# Patient Record
Sex: FEMALE | Race: WHITE | NOT HISPANIC OR LATINO
[De-identification: names, ages, dates, MRNs, and addresses within clinical notes are randomized per-mention and may not be internally consistent; named-entity substitution may affect disease eponyms.]

---

## 2017-05-08 ENCOUNTER — APPOINTMENT (OUTPATIENT)
Dept: PSYCHIATRY | Facility: CLINIC | Age: 22
End: 2017-05-08

## 2017-05-08 RX ORDER — LAMOTRIGINE 100 MG/1
100 TABLET ORAL
Qty: 60 | Refills: 0 | Status: DISCONTINUED | COMMUNITY
Start: 2016-12-28

## 2017-05-08 RX ORDER — FLUCONAZOLE 150 MG/1
150 TABLET ORAL
Qty: 1 | Refills: 0 | Status: DISCONTINUED | COMMUNITY
Start: 2016-12-29

## 2017-05-08 RX ORDER — GABAPENTIN 300 MG/1
300 CAPSULE ORAL
Qty: 60 | Refills: 0 | Status: DISCONTINUED | COMMUNITY
Start: 2016-12-28

## 2017-05-16 ENCOUNTER — APPOINTMENT (OUTPATIENT)
Dept: PSYCHIATRY | Facility: CLINIC | Age: 22
End: 2017-05-16

## 2017-05-16 DIAGNOSIS — G47.00 INSOMNIA, UNSPECIFIED: ICD-10-CM

## 2017-05-19 ENCOUNTER — TRANSCRIPTION ENCOUNTER (OUTPATIENT)
Age: 22
End: 2017-05-19

## 2017-05-20 ENCOUNTER — EMERGENCY (EMERGENCY)
Facility: HOSPITAL | Age: 22
LOS: 1 days | Discharge: HOME | End: 2017-05-20
Attending: EMERGENCY MEDICINE | Admitting: EMERGENCY MEDICINE
Payer: COMMERCIAL

## 2017-05-20 VITALS
WEIGHT: 95.02 LBS | TEMPERATURE: 98 F | DIASTOLIC BLOOD PRESSURE: 80 MMHG | HEART RATE: 81 BPM | HEIGHT: 62 IN | SYSTOLIC BLOOD PRESSURE: 116 MMHG | OXYGEN SATURATION: 98 % | RESPIRATION RATE: 18 BRPM

## 2017-05-20 PROCEDURE — 99282 EMERGENCY DEPT VISIT SF MDM: CPT

## 2017-05-20 PROCEDURE — 99283 EMERGENCY DEPT VISIT LOW MDM: CPT

## 2017-05-20 NOTE — ED PROVIDER NOTE - DETAILS:
I, Fox Arshad, performed the initial face to face bedside interview with this patient regarding history of present illness, review of symptoms and relevant past medical, social and family history.  I completed an independent physical examination.  I was the initial provider who evaluated this patient.  The history, relevant review of systems, past medical and surgical history, medical decision making, and physical examination was documented by the scribe in my presence and I attest to the accuracy of the documentation.

## 2017-05-20 NOTE — ED PROVIDER NOTE - NS ED MD SCRIBE ATTENDING SCRIBE SECTIONS
PAST MEDICAL/SURGICAL/SOCIAL HISTORY/PHYSICAL EXAM/VITAL SIGNS( Pullset)/REVIEW OF SYSTEMS/HISTORY OF PRESENT ILLNESS

## 2017-05-20 NOTE — ED ADULT NURSE NOTE - CHIEF COMPLAINT QUOTE
For awhile now, since the winter, I have piercing numbness to my chin.  I saw ashley  who dx with TMJ, saw PMD who gave me an antifungal cream and was dissmissive.

## 2017-05-20 NOTE — ED ADULT NURSE NOTE - OBJECTIVE STATEMENT
22YO female walked in ED, pt c/o "chin numbness". pt denies any pain, trauma. pt indicates "this has been happening for some time now".

## 2017-05-20 NOTE — ED PROVIDER NOTE - OBJECTIVE STATEMENT
20 y/o F w/ no significant PMHx presents to the ED c/o numbness for a long time. Pt states that "particularly in cold weathers, I get acute numbness in my chin." Pt states that she went to a maxillofacial specialist and that previous MDs that she has seen have not been able to narrow down on her condition. Pt denies having any recent dental work but notes that she has had cavities in her molars. Pt denies  fever, cough, tick bite, rash or any other complaints. Saw a neuro recently for an intense temporal pressure along with lower back pain. Pt takes Lamictal, Gabapentin, Seroquel, Clonazepam. Marijuana usage, social drinker,

## 2017-05-20 NOTE — ED PROVIDER NOTE - ENMT, MLM
Airway patent, Nasal mucosa clear. Mouth with normal mucosa. Throat has no vesicles, no oropharyngeal exudates and uvula is midline. No clicking of jaws

## 2017-05-21 ENCOUNTER — EMERGENCY (EMERGENCY)
Facility: HOSPITAL | Age: 22
LOS: 1 days | Discharge: HOME | End: 2017-05-21
Attending: EMERGENCY MEDICINE | Admitting: EMERGENCY MEDICINE
Payer: COMMERCIAL

## 2017-05-21 VITALS
SYSTOLIC BLOOD PRESSURE: 104 MMHG | TEMPERATURE: 99 F | DIASTOLIC BLOOD PRESSURE: 69 MMHG | HEART RATE: 100 BPM | HEIGHT: 62 IN | RESPIRATION RATE: 16 BRPM | OXYGEN SATURATION: 99 % | WEIGHT: 100.09 LBS

## 2017-05-21 VITALS
TEMPERATURE: 98 F | HEART RATE: 82 BPM | SYSTOLIC BLOOD PRESSURE: 111 MMHG | DIASTOLIC BLOOD PRESSURE: 74 MMHG | RESPIRATION RATE: 18 BRPM | OXYGEN SATURATION: 99 %

## 2017-05-21 DIAGNOSIS — R20.0 ANESTHESIA OF SKIN: ICD-10-CM

## 2017-05-21 DIAGNOSIS — F41.8 OTHER SPECIFIED ANXIETY DISORDERS: ICD-10-CM

## 2017-05-21 DIAGNOSIS — G62.9 POLYNEUROPATHY, UNSPECIFIED: ICD-10-CM

## 2017-05-21 PROCEDURE — 99283 EMERGENCY DEPT VISIT LOW MDM: CPT

## 2017-05-21 PROCEDURE — 99282 EMERGENCY DEPT VISIT SF MDM: CPT

## 2017-05-21 NOTE — ED ADULT NURSE NOTE - OBJECTIVE STATEMENT
Pt. received alert and oriented x4 with chief complaint of numbness to chin since winter of 2016 w/ lump to left side of chin. No deformity noted to chin.

## 2017-05-21 NOTE — ED PROVIDER NOTE - OBJECTIVE STATEMENT
chin sharp numbness chin sharp numbness.  pt was seen in  ED yesterday for same complaint, but had no MRI there. no other complaint.

## 2017-05-23 ENCOUNTER — APPOINTMENT (OUTPATIENT)
Dept: COLORECTAL SURGERY | Facility: CLINIC | Age: 22
End: 2017-05-23

## 2017-05-23 VITALS
HEART RATE: 84 BPM | DIASTOLIC BLOOD PRESSURE: 81 MMHG | WEIGHT: 95 LBS | HEIGHT: 62 IN | TEMPERATURE: 98.1 F | BODY MASS INDEX: 17.48 KG/M2 | RESPIRATION RATE: 16 BRPM | SYSTOLIC BLOOD PRESSURE: 127 MMHG

## 2017-05-23 DIAGNOSIS — K64.8 OTHER HEMORRHOIDS: ICD-10-CM

## 2017-05-24 ENCOUNTER — EMERGENCY (EMERGENCY)
Facility: HOSPITAL | Age: 22
LOS: 1 days | Discharge: HOME | End: 2017-05-24
Attending: EMERGENCY MEDICINE | Admitting: EMERGENCY MEDICINE
Payer: COMMERCIAL

## 2017-05-24 ENCOUNTER — APPOINTMENT (OUTPATIENT)
Dept: PSYCHIATRY | Facility: CLINIC | Age: 22
End: 2017-05-24

## 2017-05-24 VITALS
SYSTOLIC BLOOD PRESSURE: 108 MMHG | TEMPERATURE: 100 F | RESPIRATION RATE: 14 BRPM | DIASTOLIC BLOOD PRESSURE: 76 MMHG | OXYGEN SATURATION: 99 % | HEART RATE: 60 BPM

## 2017-05-24 VITALS
SYSTOLIC BLOOD PRESSURE: 129 MMHG | RESPIRATION RATE: 16 BRPM | TEMPERATURE: 99 F | DIASTOLIC BLOOD PRESSURE: 94 MMHG | OXYGEN SATURATION: 100 % | HEIGHT: 62 IN | HEART RATE: 81 BPM | WEIGHT: 93.04 LBS

## 2017-05-24 DIAGNOSIS — F41.9 ANXIETY DISORDER, UNSPECIFIED: ICD-10-CM

## 2017-05-24 DIAGNOSIS — F41.8 OTHER SPECIFIED ANXIETY DISORDERS: ICD-10-CM

## 2017-05-24 DIAGNOSIS — F40.00 AGORAPHOBIA, UNSPECIFIED: ICD-10-CM

## 2017-05-24 PROCEDURE — 99283 EMERGENCY DEPT VISIT LOW MDM: CPT

## 2017-05-24 PROCEDURE — 99284 EMERGENCY DEPT VISIT MOD MDM: CPT

## 2017-05-24 RX ORDER — CLONAZEPAM 1 MG
1 TABLET ORAL ONCE
Qty: 0 | Refills: 0 | Status: DISCONTINUED | OUTPATIENT
Start: 2017-05-24 | End: 2017-05-24

## 2017-05-24 RX ADMIN — Medication 1 MILLIGRAM(S): at 14:46

## 2017-05-24 NOTE — ED PROVIDER NOTE - OBJECTIVE STATEMENT
22 yo white female with slight increase in severity of her anxiety. No changes in her meds. No SI or HI

## 2017-05-24 NOTE — ED ADULT NURSE NOTE - CHPI ED SYMPTOMS NEG
no agitation/no hallucinations/no disorientation/no homicidal/no change in level of consciousness/no weight loss/no suicidal/no confusion/no paranoia/no weakness

## 2017-05-24 NOTE — ED PROVIDER NOTE - CONSTITUTIONAL, MLM
normal... Well appearing, thin white female, well nourished, awake, alert, oriented to person, place, time/situation and in no apparent distress.

## 2017-05-24 NOTE — ED ADULT NURSE NOTE - OBJECTIVE STATEMENT
Pt A&Ox4, ambulatory to ED, visibly anxious, shaking, states she has been shaky since last night, feels head pressure.  Pt currently under the care of a psychiatrist has medications prescribed but states they were recently changed almost a month ago and pt states "I think they are making my body chemistry be off."

## 2017-05-24 NOTE — ED PROVIDER NOTE - CHPI ED SYMPTOMS NEG
no disorientation/no hallucinations/no change in level of consciousness/no agitation/no suicidal/no paranoia/no confusion/no homicidal

## 2017-05-26 ENCOUNTER — APPOINTMENT (OUTPATIENT)
Dept: PSYCHIATRY | Facility: CLINIC | Age: 22
End: 2017-05-26

## 2017-05-28 ENCOUNTER — APPOINTMENT (OUTPATIENT)
Dept: MRI IMAGING | Facility: CLINIC | Age: 22
End: 2017-05-28

## 2017-05-28 ENCOUNTER — OUTPATIENT (OUTPATIENT)
Dept: OUTPATIENT SERVICES | Facility: HOSPITAL | Age: 22
LOS: 1 days | End: 2017-05-28
Payer: COMMERCIAL

## 2017-05-28 DIAGNOSIS — Z00.8 ENCOUNTER FOR OTHER GENERAL EXAMINATION: ICD-10-CM

## 2017-05-28 PROCEDURE — 70551 MRI BRAIN STEM W/O DYE: CPT

## 2017-06-05 ENCOUNTER — APPOINTMENT (OUTPATIENT)
Dept: PSYCHIATRY | Facility: CLINIC | Age: 22
End: 2017-06-05

## 2017-06-14 ENCOUNTER — APPOINTMENT (OUTPATIENT)
Dept: PSYCHIATRY | Facility: CLINIC | Age: 22
End: 2017-06-14

## 2017-06-14 RX ORDER — METHYLPREDNISOLONE 4 MG/1
4 TABLET ORAL
Qty: 21 | Refills: 0 | Status: DISCONTINUED | COMMUNITY
Start: 2017-05-11

## 2017-06-14 RX ORDER — CLONAZEPAM 1 MG/1
1 TABLET ORAL
Qty: 30 | Refills: 0 | Status: DISCONTINUED | COMMUNITY
Start: 2016-12-20 | End: 2017-06-14

## 2017-06-14 RX ORDER — DEXTROAMPHETAMINE SACCHARATE, AMPHETAMINE ASPARTATE, DEXTROAMPHETAMINE SULFATE AND AMPHETAMINE SULFATE 2.5; 2.5; 2.5; 2.5 MG/1; MG/1; MG/1; MG/1
10 TABLET ORAL
Qty: 1 | Refills: 0 | Status: DISCONTINUED | COMMUNITY
Start: 2017-05-08 | End: 2017-06-14

## 2017-08-21 ENCOUNTER — RX RENEWAL (OUTPATIENT)
Age: 22
End: 2017-08-21

## 2017-08-22 ENCOUNTER — RX RENEWAL (OUTPATIENT)
Age: 22
End: 2017-08-22

## 2017-08-25 ENCOUNTER — APPOINTMENT (OUTPATIENT)
Dept: PSYCHIATRY | Facility: CLINIC | Age: 22
End: 2017-08-25
Payer: COMMERCIAL

## 2017-08-25 PROCEDURE — 99214 OFFICE O/P EST MOD 30 MIN: CPT

## 2017-08-25 RX ORDER — QUETIAPINE FUMARATE 50 MG/1
50 TABLET ORAL
Qty: 30 | Refills: 2 | Status: DISCONTINUED | COMMUNITY
Start: 2017-05-26 | End: 2017-08-25

## 2017-08-25 RX ORDER — QUETIAPINE FUMARATE 25 MG/1
25 TABLET ORAL
Qty: 30 | Refills: 2 | Status: DISCONTINUED | COMMUNITY
Start: 2017-05-08 | End: 2017-08-25

## 2017-10-10 ENCOUNTER — APPOINTMENT (OUTPATIENT)
Dept: PSYCHIATRY | Facility: CLINIC | Age: 22
End: 2017-10-10
Payer: COMMERCIAL

## 2017-10-10 PROCEDURE — 99214 OFFICE O/P EST MOD 30 MIN: CPT

## 2017-10-30 ENCOUNTER — APPOINTMENT (OUTPATIENT)
Dept: PSYCHIATRY | Facility: CLINIC | Age: 22
End: 2017-10-30

## 2017-10-31 ENCOUNTER — APPOINTMENT (OUTPATIENT)
Dept: PSYCHIATRY | Facility: CLINIC | Age: 22
End: 2017-10-31
Payer: COMMERCIAL

## 2017-10-31 PROCEDURE — 99214 OFFICE O/P EST MOD 30 MIN: CPT

## 2017-10-31 RX ORDER — BUPROPION HYDROCHLORIDE 150 MG/1
150 TABLET, EXTENDED RELEASE ORAL DAILY
Qty: 30 | Refills: 2 | Status: DISCONTINUED | COMMUNITY
Start: 2017-10-10 | End: 2017-10-31

## 2017-10-31 RX ORDER — TRETINOIN 0.25 MG/G
0.03 CREAM TOPICAL
Qty: 20 | Refills: 0 | Status: DISCONTINUED | COMMUNITY
Start: 2017-10-04

## 2017-11-08 ENCOUNTER — TRANSCRIPTION ENCOUNTER (OUTPATIENT)
Age: 22
End: 2017-11-08

## 2017-11-08 ENCOUNTER — RX RENEWAL (OUTPATIENT)
Age: 22
End: 2017-11-08

## 2017-12-04 ENCOUNTER — RX RENEWAL (OUTPATIENT)
Age: 22
End: 2017-12-04

## 2017-12-20 ENCOUNTER — APPOINTMENT (OUTPATIENT)
Dept: PSYCHIATRY | Facility: CLINIC | Age: 22
End: 2017-12-20
Payer: SELF-PAY

## 2017-12-20 DIAGNOSIS — F12.20 CANNABIS DEPENDENCE, UNCOMPLICATED: ICD-10-CM

## 2017-12-20 DIAGNOSIS — F39 UNSPECIFIED MOOD [AFFECTIVE] DISORDER: ICD-10-CM

## 2017-12-20 DIAGNOSIS — G47.00 INSOMNIA, UNSPECIFIED: ICD-10-CM

## 2017-12-20 PROCEDURE — 99214 OFFICE O/P EST MOD 30 MIN: CPT

## 2017-12-20 RX ORDER — GABAPENTIN 600 MG/1
600 TABLET, COATED ORAL 3 TIMES DAILY
Qty: 90 | Refills: 0 | Status: ACTIVE | COMMUNITY
Start: 2017-06-14 | End: 1900-01-01

## 2017-12-20 RX ORDER — LAMOTRIGINE 200 MG/1
200 TABLET ORAL DAILY
Qty: 30 | Refills: 2 | Status: DISCONTINUED | COMMUNITY
Start: 2017-10-10 | End: 2017-12-20

## 2017-12-20 RX ORDER — FLUOXETINE HYDROCHLORIDE 20 MG/1
20 TABLET ORAL
Qty: 30 | Refills: 0 | Status: DISCONTINUED | COMMUNITY
Start: 2017-10-31 | End: 2017-12-20

## 2017-12-20 RX ORDER — CLONAZEPAM 0.5 MG/1
0.5 TABLET ORAL
Qty: 30 | Refills: 0 | Status: ACTIVE | COMMUNITY
Start: 2017-05-08 | End: 1900-01-01

## 2017-12-20 RX ORDER — LAMOTRIGINE 150 MG/1
150 TABLET ORAL TWICE DAILY
Qty: 60 | Refills: 2 | Status: ACTIVE | COMMUNITY
Start: 2017-05-08 | End: 1900-01-01

## 2017-12-20 RX ORDER — GABAPENTIN 400 MG/1
400 CAPSULE ORAL 4 TIMES DAILY
Qty: 120 | Refills: 2 | Status: DISCONTINUED | COMMUNITY
Start: 2017-05-08 | End: 2017-12-20

## 2017-12-20 RX ORDER — FLUOXETINE HYDROCHLORIDE 20 MG/1
20 CAPSULE ORAL
Qty: 30 | Refills: 2 | Status: ACTIVE | COMMUNITY
Start: 2017-10-31 | End: 1900-01-01

## 2020-03-27 NOTE — ED ADULT NURSE NOTE - CAS TRG GENERAL AIRWAY, MLM
[facility] phone call message- patient requesting a refill:    Full Medication Name: hydrochlorothiazide (HYDRODIURIL)     Dose: Take 1 tablet (25 mg) by mouth daily - Oral    Pharmacy confirmed as   [facility] - [address] [address]  Phone: [phone] Fax: [phone]  : Yes    Additional Comments: None       OK to leave a message on voice mail? Yes    Primary language: English      needed? No    Call taken on March 27, 2020 at 8:18 AM by Елена Nunn   Patent

## 2021-03-09 NOTE — ED PROVIDER NOTE - DATA REVIEWED, MDM
INPATIENT ENCOUNTER  DAILY PROGRESS NOTE        SUBJECTIVE:  Afebrile  Complaining of some congestion and headache  No cough no shortness of breath    MEDICATIONS:    Current Facility-Administered Medications   Medication Dose Route Frequency Provider Last Rate Last Admin   • hydrALAZINE (APRESOLINE) tablet 50 mg  50 mg Oral TID Federico Castañeda MD   50 mg at 03/09/21 1320   • fluticasone (FLONASE) 50 MCG/ACT nasal spray 1 spray  1 spray Each Nare Daily Cinthia Cabrales MD   1 spray at 03/09/21 1322   • hydrochlorothiazide (HYDRODIURIL) tablet 25 mg  25 mg Oral Daily Cinthia Cabrales MD       • sodium chloride (NORMAL SALINE) 0.9 % bolus 500 mL  500 mL Intravenous PRN Cinthia Cabrales MD       • Potassium Standard Replacement Protocol   Does not apply See Admin Instructions Cinthia Cabrales MD       • Magnesium Standard Replacement Protocol   Does not apply See Admin Instructions Cinthia Cabrales MD       • ondansetron (ZOFRAN) injection 4 mg  4 mg Intravenous BID PRN Cinthia Cabrales MD       • acetaminophen (TYLENOL) tablet 650 mg  650 mg Oral Q4H PRN Cinthia Cabrales MD   650 mg at 03/09/21 0853   • polyethylene glycol (MIRALAX) packet 17 g  17 g Oral Daily PRN Cinthia Cabrales MD       • docusate sodium-sennosides (SENOKOT S) 50-8.6 MG 2 tablet  2 tablet Oral Daily PRN Cinthia Cabrales MD       • magnesium hydroxide (MILK OF MAGNESIA) 400 MG/5ML suspension 30 mL  30 mL Oral Daily PRN Cinthia Cabrales MD       • aluminum-magnesium hydroxide-simethicone (MAALOX) 200-200-20 MG/5ML suspension 30 mL  30 mL Oral Q4H PRN Cinthia Cabrales MD       • sodium chloride 0.9 % flush bag 25 mL  25 mL Intravenous PRN Cinthia Cabrales MD       • hydrALAZINE (APRESOLINE) injection 10 mg  10 mg Intravenous Q6H PRN Cinthia Cabrales MD   10 mg at 03/09/21 1554   • carvedilol (COREG) tablet 12.5 mg  12.5 mg Oral BID WC Cinthia Cabrales MD   12.5 mg at 03/09/21 0853   • enoxaparin (LOVENOX) injection 40 mg  40 mg Subcutaneous QHS  Cinthia Cabrales MD   40 mg at 03/08/21 2227   • NIFEdipine XL (PROCARDIA XL) ER tablet 30 mg  30 mg Oral Daily Cinthia Cabrales MD   30 mg at 03/08/21 2226         REVIEW OF SYSTEMS:  Review of Systems   Constitutional: Negative for activity change, chills and fever.   HENT: Positive for congestion. Negative for trouble swallowing.    Eyes: Negative for visual disturbance.   Respiratory: Negative for apnea, chest tightness, shortness of breath and wheezing.    Cardiovascular: Negative for chest pain and palpitations.   Gastrointestinal: Negative for abdominal pain, nausea and vomiting.   Genitourinary: Negative for difficulty urinating and dysuria.   Skin: Negative for wound.   Neurological: Positive for headaches. Negative for dizziness and weakness.        OBJECTIVE:    VITAL SIGNS:  Visit Vitals  BP (!) 175/126   Pulse 91   Temp 98.2 °F (36.8 °C) (Oral)   Resp 14   Ht 5' 9\" (1.753 m)   Wt 98.8 kg (217 lb 13 oz)   SpO2 97%   BMI 32.17 kg/m²        INTAKE/OUTPUT:    No intake or output data in the 24 hours ending 03/09/21 1606      Physical Exam  Vitals signs and nursing note reviewed.   Constitutional:       Appearance: Normal appearance.   HENT:      Head: Normocephalic and atraumatic.      Nose: Nose normal. No congestion.      Mouth/Throat:      Mouth: Mucous membranes are moist.      Pharynx: Oropharynx is clear.   Eyes:      Extraocular Movements: Extraocular movements intact.      Conjunctiva/sclera: Conjunctivae normal.      Pupils: Pupils are equal, round, and reactive to light.   Neck:      Musculoskeletal: Neck supple. No muscular tenderness.   Cardiovascular:      Rate and Rhythm: Normal rate and regular rhythm.      Pulses: Normal pulses.      Heart sounds: Normal heart sounds.   Abdominal:      General: Abdomen is flat. Bowel sounds are normal. There is no distension.      Palpations: Abdomen is soft. There is no mass.      Tenderness: There is no abdominal tenderness. There is no guarding.    Musculoskeletal:         General: No swelling or tenderness.   Skin:     General: Skin is warm and dry.      Findings: No rash.   Neurological:      General: No focal deficit present.      Mental Status: He is alert and oriented to person, place, and time. Mental status is at baseline.   Psychiatric:         Mood and Affect: Mood normal.         Behavior: Behavior normal.           LABORATORY DATA:      Recent Results (from the past 24 hour(s))   Basic Metabolic Panel    Collection Time: 03/09/21  5:13 AM   Result Value Ref Range    Fasting Status      Sodium 140 135 - 145 mmol/L    Potassium 3.7 3.4 - 5.1 mmol/L    Chloride 104 98 - 107 mmol/L    Carbon Dioxide 27 21 - 32 mmol/L    Anion Gap 13 10 - 20 mmol/L    Glucose 98 65 - 99 mg/dL    BUN 13 6 - 20 mg/dL    Creatinine 1.52 (H) 0.67 - 1.17 mg/dL    Glomerular Filtration Rate 64 (L) >90 mL/min/1.73m2    BUN/ Creatinine Ratio 9 7 - 25    Calcium 9.1 8.4 - 10.2 mg/dL   CBC with Automated Differential (performable only)    Collection Time: 03/09/21  5:13 AM   Result Value Ref Range    WBC 6.2 4.2 - 11.0 K/mcL    RBC 5.43 4.50 - 5.90 mil/mcL    HGB 16.3 13.0 - 17.0 g/dL    HCT 48.7 39.0 - 51.0 %    MCV 89.7 78.0 - 100.0 fl    MCH 30.0 26.0 - 34.0 pg    MCHC 33.5 32.0 - 36.5 g/dL    RDW-CV 13.2 11.0 - 15.0 %    RDW-SD 43.1 39.0 - 50.0 fL     140 - 450 K/mcL    NRBC 0 <=0 /100 WBC    Neutrophil, Percent 43 %    Lymphocytes, Percent 40 %    Mono, Percent 11 %    Eosinophils, Percent 4 %    Basophils, Percent 1 %    Immature Granulocytes 1 %    Absolute Neutrophils 2.7 1.8 - 7.7 K/mcL    Absolute Lymphocytes 2.5 1.0 - 4.8 K/mcL    Absolute Monocytes 0.7 0.3 - 0.9 K/mcL    Absolute Eosinophils  0.2 0.0 - 0.5 K/mcL    Absolute Basophils 0.1 0.0 - 0.3 K/mcL    Absolute Immmature Granulocytes 0.0 0.0 - 0.2 K/mcL   Troponin I Ultra Sensitive    Collection Time: 03/09/21  8:20 AM   Result Value Ref Range    Troponin I, Ultra Sensitive 0.02 <=0.04 ng/mL   Free T4     Collection Time: 03/09/21  8:20 AM   Result Value Ref Range    T4, Free 0.9 0.8 - 1.5 ng/dL          IMAGING STUDIES:    No orders to display          ASSESSMENT AND PLAN:     41-year-old with a history of hypertension who presents to the emergency room with dizziness noted to have elevated troponin     Dizziness  Noted some improvement  Likely related to blood pressure however patient with ongoing headache will check a CT head further recommendations to follow     Elevated troponin  Pattern ongoing elevation of blood pressure patient seen and evaluated by cardiology  -Echocardiogram 3/8/2021 noted EF of 34% with grade 1 diastolic dysfunction mild MR  2016 echo noted EF 50 to 55%    ACUTE CHF ref  Clinically euvolemic continue current regimen  Continue beta-blocker we will add lisinopril  Follow-up on any further cardiology recommendations likely needs further evaluation to rule out ischemia timing as per cardiology  Blood pressure control     Hypertension with chronic kidney disease stage III  Uncontrolled  Current regimen meds adjusted    DVT Prophylaxis   subcu Lovenox    Code Status  Full          CODE STATUS:  Full Resuscitation      Cinthia Cabrales MD    vital signs/nurses notes

## 2021-03-12 ENCOUNTER — TRANSCRIPTION ENCOUNTER (OUTPATIENT)
Age: 26
End: 2021-03-12

## 2021-03-13 ENCOUNTER — EMERGENCY (EMERGENCY)
Facility: HOSPITAL | Age: 26
LOS: 1 days | Discharge: HOME | End: 2021-03-13
Attending: EMERGENCY MEDICINE | Admitting: EMERGENCY MEDICINE
Payer: MEDICAID

## 2021-03-13 VITALS
OXYGEN SATURATION: 99 % | SYSTOLIC BLOOD PRESSURE: 127 MMHG | WEIGHT: 117.95 LBS | HEIGHT: 62 IN | HEART RATE: 106 BPM | RESPIRATION RATE: 16 BRPM | DIASTOLIC BLOOD PRESSURE: 92 MMHG | TEMPERATURE: 99 F

## 2021-03-13 VITALS
OXYGEN SATURATION: 99 % | DIASTOLIC BLOOD PRESSURE: 65 MMHG | RESPIRATION RATE: 16 BRPM | HEART RATE: 84 BPM | TEMPERATURE: 99 F | SYSTOLIC BLOOD PRESSURE: 130 MMHG

## 2021-03-13 DIAGNOSIS — R42 DIZZINESS AND GIDDINESS: ICD-10-CM

## 2021-03-13 PROBLEM — F32.9 MAJOR DEPRESSIVE DISORDER, SINGLE EPISODE, UNSPECIFIED: Chronic | Status: ACTIVE | Noted: 2017-05-21

## 2021-03-13 PROBLEM — F41.9 ANXIETY DISORDER, UNSPECIFIED: Chronic | Status: ACTIVE | Noted: 2017-05-20

## 2021-03-13 PROBLEM — F34.0 CYCLOTHYMIC DISORDER: Chronic | Status: ACTIVE | Noted: 2017-05-21

## 2021-03-13 PROBLEM — F41.9 ANXIETY DISORDER, UNSPECIFIED: Chronic | Status: ACTIVE | Noted: 2017-05-21

## 2021-03-13 PROBLEM — F40.00 AGORAPHOBIA, UNSPECIFIED: Chronic | Status: ACTIVE | Noted: 2017-05-21

## 2021-03-13 PROBLEM — F32.9 MAJOR DEPRESSIVE DISORDER, SINGLE EPISODE, UNSPECIFIED: Chronic | Status: ACTIVE | Noted: 2017-05-20

## 2021-03-13 LAB
ALBUMIN SERPL ELPH-MCNC: 4.1 G/DL — SIGNIFICANT CHANGE UP (ref 3.3–5)
ALP SERPL-CCNC: 60 U/L — SIGNIFICANT CHANGE UP (ref 30–120)
ALT FLD-CCNC: 20 U/L DA — SIGNIFICANT CHANGE UP (ref 10–60)
ANION GAP SERPL CALC-SCNC: 10 MMOL/L — SIGNIFICANT CHANGE UP (ref 5–17)
APTT BLD: 29.7 SEC — SIGNIFICANT CHANGE UP (ref 27.5–35.5)
AST SERPL-CCNC: 16 U/L — SIGNIFICANT CHANGE UP (ref 10–40)
BASOPHILS # BLD AUTO: 0.05 K/UL — SIGNIFICANT CHANGE UP (ref 0–0.2)
BASOPHILS NFR BLD AUTO: 0.8 % — SIGNIFICANT CHANGE UP (ref 0–2)
BILIRUB SERPL-MCNC: 0.3 MG/DL — SIGNIFICANT CHANGE UP (ref 0.2–1.2)
BUN SERPL-MCNC: 14 MG/DL — SIGNIFICANT CHANGE UP (ref 7–23)
CALCIUM SERPL-MCNC: 9.5 MG/DL — SIGNIFICANT CHANGE UP (ref 8.4–10.5)
CHLORIDE SERPL-SCNC: 104 MMOL/L — SIGNIFICANT CHANGE UP (ref 96–108)
CO2 SERPL-SCNC: 27 MMOL/L — SIGNIFICANT CHANGE UP (ref 22–31)
CREAT SERPL-MCNC: 1.13 MG/DL — SIGNIFICANT CHANGE UP (ref 0.5–1.3)
EOSINOPHIL # BLD AUTO: 0.01 K/UL — SIGNIFICANT CHANGE UP (ref 0–0.5)
EOSINOPHIL NFR BLD AUTO: 0.2 % — SIGNIFICANT CHANGE UP (ref 0–6)
GLUCOSE SERPL-MCNC: 110 MG/DL — HIGH (ref 70–99)
HCG UR QL: NEGATIVE — SIGNIFICANT CHANGE UP
HCT VFR BLD CALC: 39.4 % — SIGNIFICANT CHANGE UP (ref 34.5–45)
HGB BLD-MCNC: 12.7 G/DL — SIGNIFICANT CHANGE UP (ref 11.5–15.5)
IMM GRANULOCYTES NFR BLD AUTO: 0 % — SIGNIFICANT CHANGE UP (ref 0–1.5)
INR BLD: 1.04 RATIO — SIGNIFICANT CHANGE UP (ref 0.88–1.16)
LIDOCAIN IGE QN: 63 U/L — LOW (ref 73–393)
LYMPHOCYTES # BLD AUTO: 2.3 K/UL — SIGNIFICANT CHANGE UP (ref 1–3.3)
LYMPHOCYTES # BLD AUTO: 38.7 % — SIGNIFICANT CHANGE UP (ref 13–44)
MAGNESIUM SERPL-MCNC: 1.9 MG/DL — SIGNIFICANT CHANGE UP (ref 1.6–2.6)
MCHC RBC-ENTMCNC: 29.9 PG — SIGNIFICANT CHANGE UP (ref 27–34)
MCHC RBC-ENTMCNC: 32.2 GM/DL — SIGNIFICANT CHANGE UP (ref 32–36)
MCV RBC AUTO: 92.7 FL — SIGNIFICANT CHANGE UP (ref 80–100)
MONOCYTES # BLD AUTO: 0.47 K/UL — SIGNIFICANT CHANGE UP (ref 0–0.9)
MONOCYTES NFR BLD AUTO: 7.9 % — SIGNIFICANT CHANGE UP (ref 2–14)
NEUTROPHILS # BLD AUTO: 3.12 K/UL — SIGNIFICANT CHANGE UP (ref 1.8–7.4)
NEUTROPHILS NFR BLD AUTO: 52.4 % — SIGNIFICANT CHANGE UP (ref 43–77)
NRBC # BLD: 0 /100 WBCS — SIGNIFICANT CHANGE UP (ref 0–0)
PLATELET # BLD AUTO: 269 K/UL — SIGNIFICANT CHANGE UP (ref 150–400)
POTASSIUM SERPL-MCNC: 3.9 MMOL/L — SIGNIFICANT CHANGE UP (ref 3.5–5.3)
POTASSIUM SERPL-SCNC: 3.9 MMOL/L — SIGNIFICANT CHANGE UP (ref 3.5–5.3)
PROT SERPL-MCNC: 7.4 G/DL — SIGNIFICANT CHANGE UP (ref 6–8.3)
PROTHROM AB SERPL-ACNC: 12.6 SEC — SIGNIFICANT CHANGE UP (ref 10.6–13.6)
RBC # BLD: 4.25 M/UL — SIGNIFICANT CHANGE UP (ref 3.8–5.2)
RBC # FLD: 12.5 % — SIGNIFICANT CHANGE UP (ref 10.3–14.5)
SODIUM SERPL-SCNC: 141 MMOL/L — SIGNIFICANT CHANGE UP (ref 135–145)
WBC # BLD: 5.95 K/UL — SIGNIFICANT CHANGE UP (ref 3.8–10.5)
WBC # FLD AUTO: 5.95 K/UL — SIGNIFICANT CHANGE UP (ref 3.8–10.5)

## 2021-03-13 PROCEDURE — 70450 CT HEAD/BRAIN W/O DYE: CPT

## 2021-03-13 PROCEDURE — 36415 COLL VENOUS BLD VENIPUNCTURE: CPT

## 2021-03-13 PROCEDURE — 96361 HYDRATE IV INFUSION ADD-ON: CPT

## 2021-03-13 PROCEDURE — 70498 CT ANGIOGRAPHY NECK: CPT

## 2021-03-13 PROCEDURE — 70498 CT ANGIOGRAPHY NECK: CPT | Mod: 26

## 2021-03-13 PROCEDURE — 85730 THROMBOPLASTIN TIME PARTIAL: CPT

## 2021-03-13 PROCEDURE — 85610 PROTHROMBIN TIME: CPT

## 2021-03-13 PROCEDURE — 96374 THER/PROPH/DIAG INJ IV PUSH: CPT | Mod: XU

## 2021-03-13 PROCEDURE — 99284 EMERGENCY DEPT VISIT MOD MDM: CPT | Mod: 25

## 2021-03-13 PROCEDURE — 80053 COMPREHEN METABOLIC PANEL: CPT

## 2021-03-13 PROCEDURE — 83735 ASSAY OF MAGNESIUM: CPT

## 2021-03-13 PROCEDURE — 85025 COMPLETE CBC W/AUTO DIFF WBC: CPT

## 2021-03-13 PROCEDURE — 96375 TX/PRO/DX INJ NEW DRUG ADDON: CPT | Mod: XU

## 2021-03-13 PROCEDURE — 81025 URINE PREGNANCY TEST: CPT

## 2021-03-13 PROCEDURE — 83690 ASSAY OF LIPASE: CPT

## 2021-03-13 PROCEDURE — 93005 ELECTROCARDIOGRAM TRACING: CPT

## 2021-03-13 PROCEDURE — 70496 CT ANGIOGRAPHY HEAD: CPT

## 2021-03-13 PROCEDURE — 70496 CT ANGIOGRAPHY HEAD: CPT | Mod: 26

## 2021-03-13 PROCEDURE — 99285 EMERGENCY DEPT VISIT HI MDM: CPT

## 2021-03-13 PROCEDURE — 93010 ELECTROCARDIOGRAM REPORT: CPT

## 2021-03-13 RX ORDER — MECLIZINE HCL 12.5 MG
25 TABLET ORAL ONCE
Refills: 0 | Status: COMPLETED | OUTPATIENT
Start: 2021-03-13 | End: 2021-03-13

## 2021-03-13 RX ORDER — QUETIAPINE FUMARATE 200 MG/1
0 TABLET, FILM COATED ORAL
Qty: 0 | Refills: 0 | DISCHARGE

## 2021-03-13 RX ORDER — LAMOTRIGINE 25 MG/1
1 TABLET, ORALLY DISINTEGRATING ORAL
Qty: 0 | Refills: 0 | DISCHARGE

## 2021-03-13 RX ORDER — SODIUM CHLORIDE 9 MG/ML
1000 INJECTION INTRAMUSCULAR; INTRAVENOUS; SUBCUTANEOUS ONCE
Refills: 0 | Status: COMPLETED | OUTPATIENT
Start: 2021-03-13 | End: 2021-03-13

## 2021-03-13 RX ORDER — DIAZEPAM 5 MG
5 TABLET ORAL ONCE
Refills: 0 | Status: DISCONTINUED | OUTPATIENT
Start: 2021-03-13 | End: 2021-03-13

## 2021-03-13 RX ORDER — CLONAZEPAM 1 MG
0 TABLET ORAL
Qty: 0 | Refills: 0 | DISCHARGE

## 2021-03-13 RX ORDER — MECLIZINE HCL 12.5 MG
1 TABLET ORAL
Qty: 15 | Refills: 0
Start: 2021-03-13 | End: 2021-03-17

## 2021-03-13 RX ORDER — GABAPENTIN 400 MG/1
1 CAPSULE ORAL
Qty: 0 | Refills: 0 | DISCHARGE

## 2021-03-13 RX ORDER — GABAPENTIN 400 MG/1
0 CAPSULE ORAL
Qty: 0 | Refills: 0 | DISCHARGE

## 2021-03-13 RX ADMIN — SODIUM CHLORIDE 1000 MILLILITER(S): 9 INJECTION INTRAMUSCULAR; INTRAVENOUS; SUBCUTANEOUS at 12:49

## 2021-03-13 RX ADMIN — SODIUM CHLORIDE 1000 MILLILITER(S): 9 INJECTION INTRAMUSCULAR; INTRAVENOUS; SUBCUTANEOUS at 11:47

## 2021-03-13 RX ADMIN — Medication 25 MILLIGRAM(S): at 11:48

## 2021-03-13 RX ADMIN — Medication 125 MILLIGRAM(S): at 11:48

## 2021-03-13 RX ADMIN — Medication 5 MILLIGRAM(S): at 11:48

## 2021-03-13 NOTE — ED PROVIDER NOTE - CARE PROVIDER_API CALL
Nhan Davis  NEUROLOGY  [address] [place] [address]  Phone: [phone]  Fax: [phone]  Follow Up Time:

## 2021-03-13 NOTE — ED PROVIDER NOTE - CLINICAL SUMMARY MEDICAL DECISION MAKING FREE TEXT BOX
Pt is a 26 yo female who presents to the ED with a cc of dizziness. PMHx of Agoraphobia, Anxiety, cyclothymic disorder, depression.  Pt reports that approx 3 days ago she developed which she describes as the sensation that the room is spinning. Pt reports that the symptoms wax and wane in severity but have never full resolved. She reports that the symptoms are worse with movement and in the morning but do not resolve when lying still. She also reports intermittent HAs, double vision, and she states that when walking she feels off balance. She was seen at urgent care yesterday and was diagnosed with vertigo. She took Meclizine at the urgent care again last night and then this morning with no relief. Denies fever, chills, ear pain, sore throat, CP, SOB, abd pain, ext numbness. Denies prior similar episodes. Pt is a 24 yo female who presents to the ED with a cc of dizziness. PMHx of Agoraphobia, Anxiety, cyclothymic disorder, depression.  Pt reports that approx 3 days ago she developed which she describes as the sensation that the room is spinning. Pt reports that the symptoms wax and wane in severity but have never full resolved. She reports that the symptoms are worse with movement and in the morning but do not resolve when lying still. She also reports intermittent HAs, double vision, and she states that when walking she feels off balance. She was seen at urgent care yesterday and was diagnosed with vertigo. She took Meclizine at the urgent care again last night and then this morning with no relief. Denies fever, chills, ear pain, sore throat, CP, SOB, abd pain, ext numbness. Denies prior similar episodes. Pt with s/s concerning for vertigo but due to 2nd visit will obtain screening labs, image head and monitor. Will medicate for symptoms

## 2021-03-13 NOTE — ED ADULT NURSE NOTE - OBJECTIVE STATEMENT
Amb to ED. Pt c/o "the room is spinning for the past 3 days" Seen @ Urgent Care yesterday & dx with vertigo & given Meclizine. Pt states it's not working. States she has intermittent visual disturbances & headaches. No nausea or vomiting. O/E color fair. Skin warm & dry to touch. Lungs clear. Abd soft, nontender.

## 2021-03-13 NOTE — ED PROVIDER NOTE - PATIENT PORTAL LINK FT
You can access the FollowMyHealth Patient Portal offered by [facility] by registering at the following website: [email or web address]. By joining [facility]’s FollowMyHealth portal, you will also be able to view your health information using other applications (apps) compatible with our system.

## 2021-03-13 NOTE — ED ADULT TRIAGE NOTE - CHIEF COMPLAINT QUOTE
" for the past 3 days now especially in the morning  - the room feels like spinning " Pt went to a urgent care yesterday and was diagnosed with vertigo Rx meclizine - took 1 tablet @ 9 am

## 2021-03-13 NOTE — ED PROVIDER NOTE - OBJECTIVE STATEMENT
Pt is a 24 yo female who presents to the ED with a cc of dizziness. PMHx of Agoraphobia, Anxiety, cyclothymic disorder, depression.  Pt reports that approx 3 days ago she developed which she describes as the sensation that the room is spinning. Pt reports that the symptoms wax and wane in severity but have never full resolved. She reports that the symptoms are worse with movement and in the morning but do not resolve when lying still. She also reports intermittent HAs, double vision, and she states that when walking she feels off balance. She was seen at urgent care yesterday and was diagnosed with vertigo. She took Meclizine at the urgent care again last night and then this morning with no relief. Denies fever, chills, ear pain, sore throat, CP, SOB, abd pain, ext numbness. Denies prior similar episodes.

## 2021-03-13 NOTE — ED PROVIDER NOTE - NSFOLLOWUPINSTRUCTIONS_ED_ALL_ED_FT
Return to the ED for any new or worsening symptoms  Take your medication as prescribed  Meclizine per label instructions as needed for dizziness  Make sure to remain hydrated  Follow up with neurology call on Monday to schedule follow up  Advance activity as tolerated

## 2022-04-04 NOTE — ED ADULT TRIAGE NOTE - MODE OF ARRIVAL
[FreeTextEntry8] : pre op covid swab only for colonoscopy with Dr. Mcdonough\par H/o diverticulosis
Walk in

## 2023-12-21 ENCOUNTER — NON-APPOINTMENT (OUTPATIENT)
Age: 28
End: 2023-12-21

## 2023-12-29 ENCOUNTER — NON-APPOINTMENT (OUTPATIENT)
Age: 28
End: 2023-12-29

## 2024-01-05 ENCOUNTER — NON-APPOINTMENT (OUTPATIENT)
Age: 29
End: 2024-01-05

## 2024-01-11 ENCOUNTER — NON-APPOINTMENT (OUTPATIENT)
Age: 29
End: 2024-01-11

## 2024-03-05 NOTE — ED PROVIDER NOTE - NO SIGNIFICANT PAST SURGICAL HISTORY
Spoke with patient and they notified understanding. Office number left with patient if they have further questions later.      <<----- Click to add NO significant Past Surgical History

## 2024-03-26 ENCOUNTER — NON-APPOINTMENT (OUTPATIENT)
Age: 29
End: 2024-03-26

## 2024-07-02 NOTE — ED ADULT NURSE NOTE - TEMPLATE LIST FOR HEAD TO TOE ASSESSMENT
Patient daughter requesting refill: Flonase  Last OV: 5/14/24  Next OV: 9/16/24  Last refill: 5/15/24  Refilled.   Msg sent back to pt daughter.   General

## 2024-07-30 ENCOUNTER — NON-APPOINTMENT (OUTPATIENT)
Age: 29
End: 2024-07-30

## 2024-09-25 NOTE — ED ADULT NURSE NOTE - SUICIDE SCREENING QUESTION 1
PAST MEDICAL HISTORY:  Diabetes     HTN (hypertension)     Hyperlipidemia     Hypothyroidism     
No

## 2025-06-30 NOTE — ED PROVIDER NOTE - NSCAREINITIATED _GEN_ER
Physical Therapy    Physical Therapy Treatment    Patient Name: Vicky Govea  MRN: [id number]  Today's Date: 6/30/2025    Time Entry:   Time Calculation  Start Time: 1300  Stop Time: 1340  Time Calculation (min): 40 min     PT Therapeutic Procedures Time Entry  Therapeutic Exercise Time Entry: 25  Therapeutic Activity Time Entry: 15                   Assessment: Pt able to spread voids at least 2 hours apart, experiences most urgency in the evening with voids every hour. Her frequency of leakage has decreased.     Plan: continue to progress treatment per POC, precautions, and pt tolerance       Current Problem  1. Pelvic floor dysfunction        2. OAB (overactive bladder)  Follow Up In Physical Therapy          General  PT  Visit  PT Received On: 06/30/25  Response to Previous Treatment: Patient with no complaints from previous session.  General  Reason for Referral: Diagnosis  N32.81 (ICD-10-CM) - OAB (overactive bladder)  Referred By: Dr. Shahriar Ag  Past Medical History Relevant to Rehab: High BP (managed with medicaiton), stroke (r carotid artery blocked), lupus, R TKA 1/13/25  General Comment: Visit #2: NO AUTH, 30.00 COPAY, 100% COVERAGE, [place], 3300 OOP-NOT MET, MMO MCR    Subjective  Sometimes pt voids at night 2x. If pt takes a pain pill can sleep 5 hours then the L hip wakes pt up. Leakage has not been as bad, hardly every leaks, has not had to wear liners  Precautions  Precautions  STEADI Fall Risk Score (The score of 4 or more indicates an increased risk of falling): 4 (fall risk)  Precautions Comment: None       Pain  Pain Assessment  Pain Assessment: 0-10  0-10 (Numeric) Pain Score: 0 - No pain    Objective   N/T    Treatments:  Therapeutic Activity:  Bladder diary review:  -Able to spread voids, increased urgency towards evening  -pt experienced small leakage 1-2 times a day, mostly with stair negotiation  -Pt drank over 2/3 water in fluids each day    Therapeutic Exercise:  -prone hip ext 2x10 ea  "R/L  -TA bracing with PPT 2x10  -hooklying hip add pillow squeeze x20  -hooklying kegel 5 sec hold, x10      OP EDUCATION:   HEP, urge suppression techniques (handout), dietary irritants (handout)    Goals:  Active       Pelvic Floor       Patient will report improved sleep with less waking to void, voiding at most 1x per night       Start:  06/20/25    Expected End:  09/20/25            Reduce frequency or urine loss at least 50% to improve quality of life       Start:  06/20/25    Expected End:  09/20/25            Decrease sense of urgency/frequency of elimination by spreading all voids at least 2 hours apart       Start:  06/20/25    Expected End:  09/20/25            Pt will improve \"PFIQ-7\" score to 10 for increased independence and ease with ADL/IADL's, skills, and work/leisure activities.        Start:  06/20/25    Expected End:  09/20/25            Pt will demonstrate independence and compliance with the administered HEP exercises for ROM, strength, balance, pain, edema, scar formation.        Start:  06/20/25    Expected End:  09/20/25              " Fox Arshad(Attending)